# Patient Record
Sex: FEMALE | Race: WHITE | Employment: FULL TIME | URBAN - METROPOLITAN AREA
[De-identification: names, ages, dates, MRNs, and addresses within clinical notes are randomized per-mention and may not be internally consistent; named-entity substitution may affect disease eponyms.]

---

## 2019-05-27 ENCOUNTER — HOSPITAL ENCOUNTER (INPATIENT)
Facility: HOSPITAL | Age: 54
LOS: 2 days | Discharge: HOME | DRG: 390 | End: 2019-05-29
Attending: EMERGENCY MEDICINE | Admitting: HOSPITALIST
Payer: COMMERCIAL

## 2019-05-27 ENCOUNTER — APPOINTMENT (OUTPATIENT)
Dept: GENERAL RADIOLOGY | Facility: HOSPITAL | Age: 54
DRG: 390 | End: 2019-05-27
Attending: EMERGENCY MEDICINE
Payer: COMMERCIAL

## 2019-05-27 ENCOUNTER — APPOINTMENT (OUTPATIENT)
Dept: CT IMAGING | Facility: HOSPITAL | Age: 54
DRG: 390 | End: 2019-05-27
Attending: EMERGENCY MEDICINE
Payer: COMMERCIAL

## 2019-05-27 DIAGNOSIS — K56.609 SMALL BOWEL OBSTRUCTION (HCC): Primary | ICD-10-CM

## 2019-05-27 PROCEDURE — 99222 1ST HOSP IP/OBS MODERATE 55: CPT | Performed by: HOSPITALIST

## 2019-05-27 PROCEDURE — 74177 CT ABD & PELVIS W/CONTRAST: CPT | Performed by: EMERGENCY MEDICINE

## 2019-05-27 PROCEDURE — 71045 X-RAY EXAM CHEST 1 VIEW: CPT | Performed by: EMERGENCY MEDICINE

## 2019-05-27 RX ORDER — ONDANSETRON 2 MG/ML
4 INJECTION INTRAMUSCULAR; INTRAVENOUS ONCE
Status: COMPLETED | OUTPATIENT
Start: 2019-05-27 | End: 2019-05-27

## 2019-05-27 RX ORDER — MORPHINE SULFATE 2 MG/ML
2 INJECTION, SOLUTION INTRAMUSCULAR; INTRAVENOUS EVERY 2 HOUR PRN
Status: DISCONTINUED | OUTPATIENT
Start: 2019-05-27 | End: 2019-05-29

## 2019-05-27 RX ORDER — ONDANSETRON 2 MG/ML
INJECTION INTRAMUSCULAR; INTRAVENOUS
Status: COMPLETED
Start: 2019-05-27 | End: 2019-05-27

## 2019-05-27 RX ORDER — MORPHINE SULFATE 4 MG/ML
4 INJECTION, SOLUTION INTRAMUSCULAR; INTRAVENOUS EVERY 2 HOUR PRN
Status: DISCONTINUED | OUTPATIENT
Start: 2019-05-27 | End: 2019-05-29

## 2019-05-27 RX ORDER — MORPHINE SULFATE 2 MG/ML
1 INJECTION, SOLUTION INTRAMUSCULAR; INTRAVENOUS EVERY 2 HOUR PRN
Status: DISCONTINUED | OUTPATIENT
Start: 2019-05-27 | End: 2019-05-29

## 2019-05-27 RX ORDER — SODIUM CHLORIDE 0.9 % (FLUSH) 0.9 %
3 SYRINGE (ML) INJECTION AS NEEDED
Status: DISCONTINUED | OUTPATIENT
Start: 2019-05-27 | End: 2019-05-29

## 2019-05-27 RX ORDER — SODIUM CHLORIDE, SODIUM LACTATE, POTASSIUM CHLORIDE, CALCIUM CHLORIDE 600; 310; 30; 20 MG/100ML; MG/100ML; MG/100ML; MG/100ML
INJECTION, SOLUTION INTRAVENOUS CONTINUOUS
Status: DISCONTINUED | OUTPATIENT
Start: 2019-05-27 | End: 2019-05-29

## 2019-05-27 RX ORDER — MORPHINE SULFATE 4 MG/ML
4 INJECTION, SOLUTION INTRAMUSCULAR; INTRAVENOUS ONCE
Status: COMPLETED | OUTPATIENT
Start: 2019-05-27 | End: 2019-05-27

## 2019-05-27 RX ORDER — ONDANSETRON 2 MG/ML
4 INJECTION INTRAMUSCULAR; INTRAVENOUS EVERY 6 HOURS PRN
Status: DISCONTINUED | OUTPATIENT
Start: 2019-05-27 | End: 2019-05-29

## 2019-05-27 NOTE — CONSULTS
[facility]    Report of Consultation    Vida Lam Patient Status:  Inpatient    1965 MRN [id number]   Location [facility] 4W/SW/SE Attending Liat De Santiago MD   Hosp Day # 0 [facility] 57 [facility]     Date of Admission: none, frequency none  MUSCULOSKELETAL: no joint complaints upper or lower extremities  NEURO: no sensory or motor complaint  HEMATOLOGY: no bruising or excessive bleeding; anticoagulants: none  ENDOCRINE: denies excessive thirst or urination; denies unexpe technique. 3. Linear atelectasis and/or scar left lung base.     Dictated by (CST): Claudia Arndt MD on 5/27/2019 at 11:10     Approved by (CST): Claudia Arndt MD on 5/27/2019 at 11:12          Ct Abdomen Pelvis Iv Contrast, No Oral (er)    Result Date: 5

## 2019-05-27 NOTE — ED PROVIDER NOTES
Patient Seen in: [facility] AND [facility] Emergency Department    History   Patient presents with:  Abdomen/Flank Pain (GI/)    Stated Complaint:     HPI    67-year-old female presents for complaint of severe epigastric pain for the past 4 hours.   Pain is con and well-nourished. HENT:   Head: Normocephalic and atraumatic. Eyes: Pupils are equal, round, and reactive to light. EOM are normal.   Neck: Normal range of motion. Neck supple.    Cardiovascular: Normal rate, regular rhythm, normal heart sounds and in interpreted by ed physician. MDM    CBC BMP hepatic panel lipase all unremarkable. EKG without acute ischemic changes. An NG was placed. Patient had 400 mL output. CT scan is consistent with small bowel obstruction.   Patient will be ad mass. GALLBLADDER: Normal wall thickness. No pericholecystic fluid. BILIARY: No intra-or extrahepatic biliary ductal dilation. SPLEEN: Unremarkable PANCREAS: The pancreas enhances symmetrically. No ductal dilation.  ADRENALS: Unremarkable KIDNEYS: The kidn Plan     Clinical Impression:  Small bowel obstruction ([facility] [address]  (primary encounter diagnosis)    Disposition:  Admit  5/27/2019 12:58 pm    Follow-up:  No follow-up provider specified.       Medications Prescribed:  There are no discharge medications for this

## 2019-05-27 NOTE — H&P
[facility]    PATIENT'S NAME: Katelynn Bowen   ATTENDING PHYSICIAN: Love Landeros MD   PATIENT ACCOUNT#:   [id number]    LOCATION:  [place] [address]  MEDICAL RECORD #:   [id number]       YOB: 1965  ADMISSION DATE:       05/ Anicteric sclerae. Pupils equal, round, reactive to light and accommodation. Ears, nose normal.  NG tube in place with brownish liquid in container bag. NECK:  Supple. No lymphadenopathy. Trachea midline. Full range of motion.    LUNGS:  Clear to [employer]

## 2019-05-27 NOTE — PLAN OF CARE
Problem: Patient/Family Goals  Goal: Patient/Family Long Term Goal  Description  Patient's Long Term Goal: To have my pain controlled    Interventions:  - To take pain medications as prescribed  - See additional Care Plan goals for specific interventions nausea and vomiting  Description  INTERVENTIONS:  - Maintain adequate hydration with IV or PO as ordered and tolerated  - Nasogastric tube to low intermittent suction as ordered  - Evaluate effectiveness of ordered antiemetic medications  - Provide nonphar of care  - Consider collaborating with pharmacy to review patient's medication profile  - Implement strategies to promote bladder emptying  Outcome: Progressing     Problem: MUSCULOSKELETAL - ADULT  Goal: Return mobility to safest level of function  Anupam

## 2019-05-28 ENCOUNTER — APPOINTMENT (OUTPATIENT)
Dept: GENERAL RADIOLOGY | Facility: HOSPITAL | Age: 54
DRG: 390 | End: 2019-05-28
Attending: SURGERY
Payer: COMMERCIAL

## 2019-05-28 PROCEDURE — 74019 RADEX ABDOMEN 2 VIEWS: CPT | Performed by: SURGERY

## 2019-05-28 PROCEDURE — 99233 SBSQ HOSP IP/OBS HIGH 50: CPT | Performed by: HOSPITALIST

## 2019-05-28 RX ORDER — HEPARIN SODIUM 5000 [USP'U]/ML
5000 INJECTION, SOLUTION INTRAVENOUS; SUBCUTANEOUS EVERY 8 HOURS SCHEDULED
Status: DISCONTINUED | OUTPATIENT
Start: 2019-05-28 | End: 2019-05-29

## 2019-05-28 RX ORDER — PANTOPRAZOLE SODIUM 40 MG/1
40 TABLET, DELAYED RELEASE ORAL
Status: DISCONTINUED | OUTPATIENT
Start: 2019-05-28 | End: 2019-05-29

## 2019-05-28 NOTE — PLAN OF CARE
Pt's vital signs stable. PRN morphine provided for pain. Alert and oriented x4. On room air. NPO. NG to LIS. Voids freely. Bedrest with bathroom privileges. SCDs for DVT prophylaxis. LR infusing at 125cc/hr. Appropriate safety precautions maintained.  Bed l and perform as needed  - Assess and instruct to report SOB or any respiratory difficulty  - Respiratory Therapy support as indicated  - Manage/alleviate anxiety  - Monitor for signs/symptoms of CO2 retention  Outcome: Progressing     Problem: Ruthie Sandifer over-consumption  Outcome: Progressing     Problem: GENITOURINARY - ADULT  Goal: Absence of urinary retention  Description  INTERVENTIONS:  - Assess patient?s ability to void and empty bladder  - Monitor intake/output and perform bladder scan as needed  -

## 2019-05-28 NOTE — PROGRESS NOTES
[place] [facility] - [facility]    Progress Note    Vallarie Los Patient Status:  Inpatient    1965 MRN [id number]   Location [facility] 4W/SW/SE Attending Andrés Urbina MD   Hosp Day # 1 PCP [facility]            Subjective:     Pt feels no p nasogastric tube is in the distal esophagus approximately 4 cm above the diaphragm. 2. Mild enlargement of cardiac silhouette which may be exaggerated by technique. 3. Linear atelectasis and/or scar left lung base.     Dictated by (CST): Andres Boxer, MD o Group  5/28/2019  11:35 AM

## 2019-05-28 NOTE — PROGRESS NOTES
[facility]  Progress Note     Magali Jara  : 1965    Status: Inpatient  Day #: 1    Attending: Laury Wood MD  PCP: Piotr Mott      Assessment and Plan     Small Bowel Obstruction 2/2 adhesions   -General surgery on consult  -C Tip of nasogastric tube is in the distal esophagus approximately 4 cm above the diaphragm. 2. Mild enlargement of cardiac silhouette which may be exaggerated by technique. 3. Linear atelectasis and/or scar left lung base.     Dictated by (CST): Marilou Vera

## 2019-05-28 NOTE — PLAN OF CARE
Problem: RESPIRATORY - ADULT  Goal: Achieves optimal ventilation and oxygenation  Description  INTERVENTIONS:  - Assess for changes in respiratory status  - Assess for changes in mentation and behavior  - Position to facilitate oxygenation and minimize r values  - Obtain nutritional consult as needed  - Optimize oral hygiene and moisture  - Encourage food from home; allow for food preferences  - Enhance eating environment  Outcome: Progressing  Goal: Achieves appropriate nutritional intake (bariatric)  Robert full liquid and she Tolarated both time, no nausea or abdominal pain at this time. Ngt d/c'ed as ordered. Pt refused her heparin shot and reported that to Dr Davon Bermudez.  Ivf continuing at 125 ml/hr

## 2019-05-29 VITALS
WEIGHT: 160 LBS | RESPIRATION RATE: 16 BRPM | BODY MASS INDEX: 23.7 KG/M2 | HEIGHT: 69 IN | OXYGEN SATURATION: 100 % | TEMPERATURE: 98 F | SYSTOLIC BLOOD PRESSURE: 161 MMHG | HEART RATE: 69 BPM | DIASTOLIC BLOOD PRESSURE: 87 MMHG

## 2019-05-29 PROCEDURE — 99239 HOSP IP/OBS DSCHRG MGMT >30: CPT | Performed by: HOSPITALIST

## 2019-05-29 RX ORDER — POLYETHYLENE GLYCOL 3350 17 G/17G
17 POWDER, FOR SOLUTION ORAL 2 TIMES DAILY PRN
Qty: 30 EACH | Refills: 0 | Status: SHIPPED | OUTPATIENT
Start: 2019-05-29 | End: 2019-11-04

## 2019-05-29 RX ORDER — DOCUSATE SODIUM 100 MG/1
100 CAPSULE, LIQUID FILLED ORAL 2 TIMES DAILY
Qty: 60 CAPSULE | Refills: 0 | Status: SHIPPED | OUTPATIENT
Start: 2019-05-29 | End: 2019-11-04

## 2019-05-29 NOTE — DISCHARGE SUMMARY
[facility]    Discharge Summary    Ping Jain Patient Status:  Inpatient    1965 MRN [id number]   Location [facility] 4W/SW/SE Attending Claudia Garcia MD   Hosp Day # 2 PCP 57 [facility]     Date of Admission: / General surgery    Discharge Condition:  Good    Discharge Medications:      Discharge Medications      START taking these medications      Instructions Prescription details   docusate sodium 100 MG Caps  Commonly known as:  COLACE      Take 1 capsule (100

## 2019-05-29 NOTE — PLAN OF CARE
Problem: Patient/Family Goals  Goal: Patient/Family Long Term Goal  Description  Patient's Long Term Goal: Go home    Interventions:  - advance diet as tolerated   - See additional Care Plan goals for specific interventions  Outcome: Adequate for Dischar vomiting  Description  INTERVENTIONS:  - Maintain adequate hydration with IV or PO as ordered and tolerated  - Nasogastric tube to low intermittent suction as ordered  - Evaluate effectiveness of ordered antiemetic medications  - Provide nonpharmacologic c urinary retention protocol/standard of care  - Consider collaborating with pharmacy to review patient's medication profile  - Implement strategies to promote bladder emptying  Outcome: Adequate for Discharge     Problem: MUSCULOSKELETAL - ADULT  Goal: Retu

## 2019-05-29 NOTE — PLAN OF CARE
Problem: Patient/Family Goals  Goal: Patient/Family Long Term Goal  Description  Patient's Long Term Goal:    Interventions:  -  - See additional Care Plan goals for specific interventions  Outcome: Progressing  Goal: Patient/Family Short Term Goal  Desc ordered  - Evaluate effectiveness of ordered antiemetic medications  - Provide nonpharmacologic comfort measures as appropriate  - Advance diet as tolerated, if ordered  - Obtain nutritional consult as needed  - Evaluate fluid balance  Outcome: Progressing Problem: MUSCULOSKELETAL - ADULT  Goal: Return mobility to safest level of function  Description  INTERVENTIONS:  - Assess patient stability and activity tolerance for standing, transferring and ambulating w/ or w/o assistive devices  - Assist with trans

## 2019-05-29 NOTE — DIETARY NOTE
Educated pt on low fiber diet for 2 weeks with progression to higher feber diet as tolerated per MD order. Handout and contact information provided. Urged chewing foods well and drinking adequate fluids.

## 2019-06-14 ENCOUNTER — OFFICE VISIT (OUTPATIENT)
Dept: OTHER | Facility: HOSPITAL | Age: 54
End: 2019-06-14
Attending: EMERGENCY MEDICINE

## 2019-06-14 DIAGNOSIS — Z00.00 ROUTINE GENERAL MEDICAL EXAMINATION AT A HEALTH CARE FACILITY: Primary | ICD-10-CM

## 2019-06-14 PROCEDURE — 86480 TB TEST CELL IMMUN MEASURE: CPT

## 2019-10-04 NOTE — LETTER
November 11, 2019         ALEX Elder, FNP-C  [address]  [address] [place] [address]  [facility]      Patient: Sravani Newby   YOB: 1965   Date of Visit: 11/11/2019       Dear ALEX Richardson, FNP-C,    I saw your patient, D [Negative] : Genitourinary [Depression] : no depression [Dyspareunia] : no dyspareunia [FreeTextEntry3] : Pt is feeling good, denies depression [FreeTextEntry4] : No urinary complaints [de-identified] : Pt denies any SOB  [de-identified] : Pt denies any calf pain

## 2019-11-01 ENCOUNTER — TELEPHONE (OUTPATIENT)
Dept: FAMILY MEDICINE CLINIC | Facility: CLINIC | Age: 54
End: 2019-11-01

## 2019-11-01 NOTE — TELEPHONE ENCOUNTER
Reason for Call/Symptoms: right foot pain. 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 Per patient, after work, her right foot in the arch of the foot is very painful 9/10.  Patient also co

## 2019-11-01 NOTE — TELEPHONE ENCOUNTER
Patient states has been having pain inside right foot every day and have some pain now, she has an appoijntment scheduled on 11/4 with ALEX Lyon    Transferred to triage

## 2019-11-04 ENCOUNTER — HOSPITAL ENCOUNTER (OUTPATIENT)
Dept: GENERAL RADIOLOGY | Age: 54
Discharge: HOME | End: 2019-11-04
Attending: NURSE PRACTITIONER
Payer: COMMERCIAL

## 2019-11-04 ENCOUNTER — OFFICE VISIT (OUTPATIENT)
Dept: FAMILY MEDICINE CLINIC | Facility: CLINIC | Age: 54
End: 2019-11-04

## 2019-11-04 VITALS
DIASTOLIC BLOOD PRESSURE: 64 MMHG | WEIGHT: 168 LBS | BODY MASS INDEX: 25.46 KG/M2 | HEIGHT: 68 IN | SYSTOLIC BLOOD PRESSURE: 102 MMHG | HEART RATE: 72 BPM

## 2019-11-04 DIAGNOSIS — M25.562 ACUTE PAIN OF LEFT KNEE: ICD-10-CM

## 2019-11-04 DIAGNOSIS — M79.671 RIGHT FOOT PAIN: ICD-10-CM

## 2019-11-04 DIAGNOSIS — M25.562 ACUTE PAIN OF LEFT KNEE: Primary | ICD-10-CM

## 2019-11-04 PROBLEM — K56.609 SMALL BOWEL OBSTRUCTION (HCC): Status: RESOLVED | Noted: 2019-05-27 | Resolved: 2019-11-04

## 2019-11-04 PROCEDURE — 73630 X-RAY EXAM OF FOOT: CPT | Performed by: NURSE PRACTITIONER

## 2019-11-04 PROCEDURE — 73562 X-RAY EXAM OF KNEE 3: CPT | Performed by: NURSE PRACTITIONER

## 2019-11-04 PROCEDURE — 99204 OFFICE O/P NEW MOD 45 MIN: CPT | Performed by: NURSE PRACTITIONER

## 2019-11-04 RX ORDER — MELOXICAM 15 MG/1
15 TABLET ORAL DAILY
Qty: 30 TABLET | Refills: 1 | Status: SHIPPED | OUTPATIENT
Start: 2019-11-04

## 2019-11-04 NOTE — ASSESSMENT & PLAN NOTE
meloxicam 15 mg I po q day  Ice 20 min 4-6 times per day  Right foot xray  Refer Dr Zachariah Felton

## 2019-11-04 NOTE — ASSESSMENT & PLAN NOTE
meloxicam 15 mg I po q day with food  Ice 20 min 4-6 times per day  Refer Dr Katherine Schultz  Left knee xray

## 2019-11-04 NOTE — PROGRESS NOTES
HPI  Pt here for bilat R>L foot pain. Is on her feet a lot; does 5k run/walks. Is having left knee pain. Is icing knee and pain is improving. Recently started new job in [employer] on feet constantly. Worked at a [employer] job for 20 years.  Pain in feet start Smoking status: Light Tobacco Smoker      Smokeless tobacco: Never Used    Substance and Sexual Activity      Alcohol use: Not on file      Drug use: Not on file      Sexual activity: Not on file    Lifestyle      Physical activity:        Days per wee Ice 20 min 4-6 times per day  Right foot xray  Refer Dr Vicente Preston         Relevant Medications    Meloxicam 15 MG Oral Tab    Other Relevant Orders    XR FOOT, COMPLETE (MIN 3 VIEWS), RIGHT (CPT=73630)    XR KNEE ROUTINE (3 VIEWS), LEFT (CPT=73562)    PODIATR

## 2019-11-04 NOTE — PATIENT INSTRUCTIONS
Knee Pain  Knee pain is very common. It’s especially common in active people who put a lot of pressure on their knees, like runners. It affects women more often than men. Your kneecap (patella) is a thick, round bone.  It covers and protects the front po This type of knee pain is a dull, aching pain in the front of the knee in the area under and around the kneecap. This pain may start quickly or slowly. Your pain might be worse when you squat, run, or sit for a long time.  Stairclimbing may be painful or di · Regularly do all the exercises your doctor or physical therapist advises  · Support your knee as advised by your doctor or physical therapist  · Increase training gradually, and ease up on training when needed  · Have an expert check your gait for runnin · Compression. Putting pressure on an injury helps reduce swelling and provides support. Wrap the injured area firmly with an elastic bandage or wrap. Make sure not to wrap the bandage too tightly or you will cut off blood flow to the injured area.  If your To get a good fit, you need to know the shape of your foot. Do this simple test: While standing, place your foot on a piece of paper and trace around it. Is your foot straight or curved?  Do you have a foot problem, such as a bunion, that causes your foot o · Each time you buy shoes, have both your feet measured while you are standing. Foot size changes with time. · Pick shoes to suit their purpose. High heels are OK for an occasional night on the town. But for everyday wear, choose a more sensible shoe.   ·

## 2019-11-05 ENCOUNTER — TELEPHONE (OUTPATIENT)
Dept: NEUROLOGY | Facility: CLINIC | Age: 54
End: 2019-11-05

## 2019-11-05 ENCOUNTER — OFFICE VISIT (OUTPATIENT)
Dept: NEUROLOGY | Facility: CLINIC | Age: 54
End: 2019-11-05

## 2019-11-05 VITALS
DIASTOLIC BLOOD PRESSURE: 82 MMHG | HEIGHT: 68 IN | BODY MASS INDEX: 26 KG/M2 | RESPIRATION RATE: 17 BRPM | HEART RATE: 66 BPM | SYSTOLIC BLOOD PRESSURE: 122 MMHG

## 2019-11-05 DIAGNOSIS — M17.12 PRIMARY OSTEOARTHRITIS OF LEFT KNEE: ICD-10-CM

## 2019-11-05 DIAGNOSIS — M25.462 EFFUSION OF LEFT KNEE: ICD-10-CM

## 2019-11-05 DIAGNOSIS — M22.2X9 PATELLOFEMORAL PAIN SYNDROME, UNSPECIFIED LATERALITY: Primary | ICD-10-CM

## 2019-11-05 DIAGNOSIS — M25.562 ACUTE PAIN OF LEFT KNEE: ICD-10-CM

## 2019-11-05 PROCEDURE — 99244 OFF/OP CNSLTJ NEW/EST MOD 40: CPT | Performed by: PHYSICAL MEDICINE & REHABILITATION

## 2019-11-05 NOTE — PROGRESS NOTES
Location of pain: left knee   Rate your pain: 4   Timeline of pain: 4 weeks  Characterize the pain: aching,   Worse with:standing on it for long periods  Better with: meds is helping  Medications used: meloxicam  Previous Injections: none  Previous Imaging

## 2019-11-05 NOTE — H&P
[address] H&P    Requesting Physician: Geni Hutchinson MD    Chief Complaint (Reason for Visit):  Patient presents with:  Knee Pain: Pt states that shes here for left knee pain       History of Pre HENT: Negative. Eyes: Negative. Respiratory: Negative. Cardiovascular: Negative. Gastrointestinal: Negative. Genitourinary: Negative. Musculoskeletal: As per HPI   Skin: Negative.     Neurological: As per HPI  Endo/Heme/Allergies: Tor Stamp IU/mL Final   • Quantiferon TB1 minus NIL 06/14/2019 0.00  IU/mL Final   • Quantiferon TB2 minus NIL 06/14/2019 0.00  IU/mL Final   • Quantiferon TB Mitogen minus NIL 06/14/2019 >10.00  IU/mL Final   • Quantiferon TB Result 06/14/2019 Negative  Negative Fi 13.7  11.0 - 15.0 % Final   • PLT 05/27/2019 297.0  150.0 - 450.0 10(3)uL Final   • Neutrophil Absolute Prelim 05/27/2019 8.94* 1.50 - 7.70 x10 (3) uL Final   • Neutrophil Absolute 05/27/2019 8.94* 1.50 - 7.70 x10(3) uL Final   • Lymphocyte Absolute 05/27/ • PLT 05/28/2019 273.0  150.0 - 450.0 10(3)uL Final   • Neutrophil Absolute Prelim 05/28/2019 9.90* 1.50 - 7.70 x10 (3) uL Final   • Neutrophil Absolute 05/28/2019 9.90* 1.50 - 7.70 x10(3) uL Final   • Lymphocyte Absolute 05/28/2019 1.89  1.00 - 4.00 x10 05/29/2019 109  >=60 Final   • Magnesium 05/29/2019 2.1  1.6 - 2.6 mg/dL Final   • Phosphorus 05/29/2019 3.4  2.5 - 4.9 mg/dL Final   ]      Radiology Imaging:  I reviewed with the patient her X-ray of the knees left from 11/4/2019  XR KNEE ROUTINE (3 VIEW degenerative meniscus. At this time I am recommending an aspiration and injection of the left knee.   I have placed an order for this and if the patient feels better after 1 week of being on meloxicam and physical therapy, then she can cancel the procedure

## 2019-11-05 NOTE — TELEPHONE ENCOUNTER
LEFT knee aspiration and CSI under ultrasound guidance CPT Code: 21450, 26256,   [facility], in office injections don't require authorization.  Will inform nursing

## 2019-11-05 NOTE — PATIENT INSTRUCTIONS
1) Make an appointment to see me next Tuesday for the aspiration and injection of the LEFT knee.  If you feel better and do not want the injection, then call and cancel  2) Begin formal physical therapy at [facility] in [facility]  3) Contin

## 2019-11-11 ENCOUNTER — OFFICE VISIT (OUTPATIENT)
Dept: PODIATRY CLINIC | Facility: CLINIC | Age: 54
End: 2019-11-11

## 2019-11-11 DIAGNOSIS — M77.9 TENDONITIS: Primary | ICD-10-CM

## 2019-11-11 PROCEDURE — 99243 OFF/OP CNSLTJ NEW/EST LOW 30: CPT | Performed by: PODIATRIST

## 2019-11-11 PROCEDURE — L3060 FOOT ARCH SUPP LONGITUD/META: HCPCS | Performed by: PODIATRIST

## 2019-11-11 NOTE — PROGRESS NOTES
HPI:    Patient ID: Donna Rivera is a 53year old female. This pleasant 27-year-old female presents as a new patient to me on consult from [facility]. Patient's chief concern is pain and swelling of the right foot.   She states is been present fo evening for 15 to 20 minutes. I also discussed the use of meloxicam.  Patient is well-informed and indicates an understanding. I plan no long-term care unless progressive increasing and recurrent.   She is well-informed and indicates an understanding foll

## 2019-11-12 ENCOUNTER — OFFICE VISIT (OUTPATIENT)
Dept: NEUROLOGY | Facility: CLINIC | Age: 54
End: 2019-11-12

## 2019-11-12 ENCOUNTER — TELEPHONE (OUTPATIENT)
Dept: FAMILY MEDICINE CLINIC | Facility: CLINIC | Age: 54
End: 2019-11-12

## 2019-11-12 DIAGNOSIS — M22.2X9 PATELLOFEMORAL PAIN SYNDROME, UNSPECIFIED LATERALITY: Primary | ICD-10-CM

## 2019-11-12 DIAGNOSIS — M17.12 PRIMARY OSTEOARTHRITIS OF LEFT KNEE: ICD-10-CM

## 2019-11-12 DIAGNOSIS — M25.562 ACUTE PAIN OF LEFT KNEE: ICD-10-CM

## 2019-11-12 DIAGNOSIS — M25.462 EFFUSION OF LEFT KNEE: ICD-10-CM

## 2019-11-12 PROCEDURE — 20611 DRAIN/INJ JOINT/BURSA W/US: CPT | Performed by: PHYSICAL MEDICINE & REHABILITATION

## 2019-11-12 RX ORDER — TRIAMCINOLONE ACETONIDE 40 MG/ML
40 INJECTION, SUSPENSION INTRA-ARTICULAR; INTRAMUSCULAR ONCE
Status: COMPLETED | OUTPATIENT
Start: 2019-11-12 | End: 2019-11-12

## 2019-11-12 RX ORDER — LIDOCAINE HYDROCHLORIDE 10 MG/ML
5 INJECTION, SOLUTION INFILTRATION; PERINEURAL ONCE
Status: COMPLETED | OUTPATIENT
Start: 2019-11-12 | End: 2019-11-12

## 2019-11-12 RX ORDER — LIDOCAINE HYDROCHLORIDE 10 MG/ML
1 INJECTION, SOLUTION INFILTRATION; PERINEURAL ONCE
Status: COMPLETED | OUTPATIENT
Start: 2019-11-12 | End: 2019-11-12

## 2019-11-12 RX ADMIN — LIDOCAINE HYDROCHLORIDE 5 ML: 10 INJECTION, SOLUTION INFILTRATION; PERINEURAL at 15:10:00

## 2019-11-12 RX ADMIN — LIDOCAINE HYDROCHLORIDE 1 ML: 10 INJECTION, SOLUTION INFILTRATION; PERINEURAL at 15:10:00

## 2019-11-12 RX ADMIN — TRIAMCINOLONE ACETONIDE 40 MG: 40 INJECTION, SUSPENSION INTRA-ARTICULAR; INTRAMUSCULAR at 15:11:00

## 2019-11-12 NOTE — TELEPHONE ENCOUNTER
Pt requesting a referral to see Dr. Jennifer Medina. Pt has an appt on hold for 1/17/20. States she needs the referral to schedule the appt. Please contact pt upon completed.

## 2019-11-12 NOTE — PROCEDURES
[address]   Knee Joint Injection Procedure Note    CHIEF COMPLAINT:  Patient presents with:   Injection: left knee      PROCEDURE PERFORMED:  LEF knee intra-articular corticosteroid injection under ultrasoun Total 05/27/2019 0.4  0.1 - 2.0 mg/dL Final   • Bilirubin, Direct 05/27/2019 0.1  0.0 - 0.2 mg/dL Final   • Total Protein 05/27/2019 8.5* 6.4 - 8.2 g/dL Final   • Albumin 05/27/2019 4.6  3.4 - 5.0 g/dL Final   • Lipase 05/27/2019 229  73 - 393 U/L Final Calcium, Total 05/28/2019 9.1  8.5 - 10.1 mg/dL Final   • Calculated Osmolality 05/28/2019 297* 275 - 295 mOsm/kg Final   • GFR, Non- 05/28/2019 72  >=60 Final   • GFR, -American 05/28/2019 83  >=60 Final   • WBC 05/28/2019 12.6* 4.0 Final   • Glucose 05/29/2019 104* 70 - 99 mg/dL Final   • Sodium 05/29/2019 141  136 - 145 mmol/L Final   • Potassium 05/29/2019 4.0  3.5 - 5.1 mmol/L Final   • Chloride 05/29/2019 105  98 - 112 mmol/L Final   • CO2 05/29/2019 30.0  21.0 - 32.0 mmol/L Anay assessment and plan. Patient is in agreement with the plan. All questions were answered. No barriers to learning identified. Permanent pictures were saved. INSTRUCTIONS GIVEN TO PATIENT:    \"You will see an effect in the next 2-3 days.   Please co

## 2019-11-14 ENCOUNTER — TELEPHONE (OUTPATIENT)
Dept: NEUROLOGY | Facility: CLINIC | Age: 54
End: 2019-11-14

## 2019-11-14 NOTE — TELEPHONE ENCOUNTER
Pt states that her knee is bothering here since the injection. She states that she has been feeling very shaky. She states that the more she's on her feet she's in a lot of pain.

## 2019-11-14 NOTE — TELEPHONE ENCOUNTER
Dr. Santiago Solano, patient is requesting a referral for a follow up visit with Dr. Ael Perdomo. Patient is scheduled for 01/17/20. Referral has been pended, please advise.    Please reply to pool: EM TRIAGE SUPPORT

## 2019-11-18 NOTE — TELEPHONE ENCOUNTER
Patient back and discussed her knee pain. She states she is feeling much better and feels that it was a 1 day where she had headaches, flushing, and tachycardia but this resolved. She will follow-up with me as scheduled.     The patient was in agreement w

## 2019-11-19 ENCOUNTER — TELEPHONE (OUTPATIENT)
Dept: NEUROLOGY | Facility: CLINIC | Age: 54
End: 2019-11-19

## 2019-11-19 ENCOUNTER — MED REC SCAN ONLY (OUTPATIENT)
Dept: NEUROLOGY | Facility: CLINIC | Age: 54
End: 2019-11-19

## 2019-12-12 ENCOUNTER — MED REC SCAN ONLY (OUTPATIENT)
Dept: NEUROLOGY | Facility: CLINIC | Age: 54
End: 2019-12-12

## 2020-01-07 ENCOUNTER — MED REC SCAN ONLY (OUTPATIENT)
Dept: NEUROLOGY | Facility: CLINIC | Age: 55
End: 2020-01-07

## 2020-02-03 ENCOUNTER — OFFICE VISIT (OUTPATIENT)
Dept: FAMILY MEDICINE CLINIC | Facility: CLINIC | Age: 55
End: 2020-02-03

## 2020-02-03 ENCOUNTER — NURSE TRIAGE (OUTPATIENT)
Dept: OTHER | Age: 55
End: 2020-02-03

## 2020-02-03 VITALS
HEART RATE: 75 BPM | TEMPERATURE: 98 F | WEIGHT: 171 LBS | DIASTOLIC BLOOD PRESSURE: 83 MMHG | HEIGHT: 68 IN | BODY MASS INDEX: 25.91 KG/M2 | SYSTOLIC BLOOD PRESSURE: 119 MMHG

## 2020-02-03 DIAGNOSIS — J40 BRONCHITIS: Primary | ICD-10-CM

## 2020-02-03 PROCEDURE — 99213 OFFICE O/P EST LOW 20 MIN: CPT | Performed by: NURSE PRACTITIONER

## 2020-02-03 RX ORDER — BUDESONIDE AND FORMOTEROL FUMARATE DIHYDRATE 160; 4.5 UG/1; UG/1
2 AEROSOL RESPIRATORY (INHALATION) 2 TIMES DAILY
Qty: 1 INHALER | Refills: 0 | Status: SHIPPED | OUTPATIENT
Start: 2020-02-03 | End: 2020-07-27 | Stop reason: ALTCHOICE

## 2020-02-03 RX ORDER — ALBUTEROL SULFATE 90 UG/1
2 AEROSOL, METERED RESPIRATORY (INHALATION) EVERY 4 HOURS PRN
Qty: 18 G | Refills: 5 | Status: SHIPPED | OUTPATIENT
Start: 2020-02-03

## 2020-02-03 NOTE — TELEPHONE ENCOUNTER
Action Requested: Summary for Provider     []  Critical Lab, Recommendations Needed  [] Need Additional Advice  []   FYI    []   Need Orders  [] Need Medications Sent to Pharmacy  []  Other     SUMMARY:appt made, cough, cold s/s, using symbicort/zycam x 3

## 2020-02-04 NOTE — ASSESSMENT & PLAN NOTE
Supportive care discussed to help alleviate symptoms  Albuterol hfa 2 puffs qid prn   symbicort 160/4.5 mcg 2 puffs bid   Please call if symptoms worsen or are not resolving.

## 2020-02-05 ENCOUNTER — TELEPHONE (OUTPATIENT)
Dept: FAMILY MEDICINE CLINIC | Facility: CLINIC | Age: 55
End: 2020-02-05

## 2020-02-05 NOTE — TELEPHONE ENCOUNTER
Pt informed of Dr Vasquez JOHNSON message below. Pt voiced understanding and agrees. She will stop by tomorrow to  the RTW note.

## 2020-02-05 NOTE — TELEPHONE ENCOUNTER
S/s are more in line with pt having the flu.  zpack will not do anything to help s/s. I will place note to return to work on Monday.

## 2020-02-05 NOTE — TELEPHONE ENCOUNTER
Please see note below. Pt would like to return to work on Monday. Pt also requesting zpak. Pt states she no changes in symptoms. Continue to dry cough, ears clogged, chest pain. Inhaler is helping.  Please advise

## 2020-02-05 NOTE — TELEPHONE ENCOUNTER
Patient requesting note for work. States she has been off for 3 days now. Was seen 02/3.  States she might go back to work on Monday

## 2020-02-08 ENCOUNTER — TELEPHONE (OUTPATIENT)
Dept: FAMILY MEDICINE CLINIC | Facility: CLINIC | Age: 55
End: 2020-02-08

## 2020-02-08 RX ORDER — AZITHROMYCIN 250 MG/1
TABLET, FILM COATED ORAL
Qty: 6 TABLET | Refills: 0 | Status: SHIPPED | OUTPATIENT
Start: 2020-02-08

## 2020-02-08 NOTE — TELEPHONE ENCOUNTER
Patient notified of provider's recommendation. Patient verbalized understanding. Went over home supportive care. Call office if worse or ER if significant worsening symptoms. See symptoms documented on 2/5/20 encounter related to this message.

## 2020-02-08 NOTE — TELEPHONE ENCOUNTER
Patient indicated that saw Saji Briscoe on 2/3/2020. Patient indicated that symptoms are worse.  Patient does not think it is viral thinks it might be bacterial.  Has a cough with green/brown phlegm, sore throat, painful to swallow, sinus pressure, heada

## 2020-02-08 NOTE — TELEPHONE ENCOUNTER
Pt phone call msg rec'antionette. Zpak prescribed to pt. Please advise humidifier use, steam from the shower, teas and broths to help thin out mucous. Salt water gargles. NSAIDS PRN for pain and fever.

## 2020-02-13 ENCOUNTER — TELEPHONE (OUTPATIENT)
Dept: FAMILY MEDICINE CLINIC | Facility: CLINIC | Age: 55
End: 2020-02-13

## 2020-02-13 NOTE — TELEPHONE ENCOUNTER
Pt should be using symbicort bid and using albuterol 2 puffs qid prn.  Both inhaler's needed to help decrease inflammation of lungs

## 2020-02-13 NOTE — TELEPHONE ENCOUNTER
Patient seen in [address] [place] [address] Rd 2/3/20 for bronchitis, had followed up with our office 2/5 because her symptoms were worse, she was given a z-markus.  Finished antibiotic yesterday but reports symptoms returned of coughing with phlegm, congestion, fatigue and chest heavine

## 2020-04-07 ENCOUNTER — TELEPHONE (OUTPATIENT)
Dept: FAMILY MEDICINE CLINIC | Facility: CLINIC | Age: 55
End: 2020-04-07

## 2020-04-07 NOTE — TELEPHONE ENCOUNTER
I have actually never seen this pt. Saw Jg Beaver. Will send to her to review and maybe due telephone encounter.

## 2020-04-07 NOTE — TELEPHONE ENCOUNTER
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 Has h/o chronic bronchitis and would like to take the next 2 weeks off of work.   Note placed to return on 4/19/20 at pts request

## 2020-04-07 NOTE — TELEPHONE ENCOUNTER
Dr Santosh Serra, please advise. 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 She's been off since last Thursday and is due to return to work this Thursday and is very anxious, worried due to covid19.  She had bronchitis in February, and f

## 2020-05-14 ENCOUNTER — TELEPHONE (OUTPATIENT)
Dept: FAMILY MEDICINE CLINIC | Facility: CLINIC | Age: 55
End: 2020-05-14

## 2020-05-14 NOTE — TELEPHONE ENCOUNTER
Patient requesting a call. States she has a few questions that she will like to go over regarding a positive covid coworker. States she feels great and has no symptoms. Would just like to speak to  to get some peace of mind.  Css offered corona hotline bu

## 2020-05-14 NOTE — TELEPHONE ENCOUNTER
Works at [employer]. Had employee that tested positive-no close contact w worker 2 days ago. No residents have covid  Pt wears mask and gloves and does social distancing. Will be going to her summer home in [place].      Advised to mon

## 2020-05-22 ENCOUNTER — TELEPHONE (OUTPATIENT)
Dept: FAMILY MEDICINE CLINIC | Facility: CLINIC | Age: 55
End: 2020-05-22

## 2020-05-22 NOTE — TELEPHONE ENCOUNTER
Patient completed 2 weeks of quarantine in April after co-worker at working site, [employer], tested positive for [address].      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

## 2020-05-22 NOTE — TELEPHONE ENCOUNTER
Called and discussed with patient regarding her concerns. Advise patient to practice social distancing, wear mask, glove and wash hands. Patient verbalized understanding.

## 2020-05-29 NOTE — TELEPHONE ENCOUNTER
Patient calling again for reassurance. Advised to research the [facility] RACHAEL website for updates but not to watch tv all day. Per patient she is symptom free in another state and feels good. Advised to call if she develops symptoms. She agreed to do so.

## 2020-07-13 ENCOUNTER — TELEPHONE (OUTPATIENT)
Dept: FAMILY MEDICINE CLINIC | Facility: CLINIC | Age: 55
End: 2020-07-13

## 2020-07-27 ENCOUNTER — TELEPHONE (OUTPATIENT)
Dept: CASE MANAGEMENT | Age: 55
End: 2020-07-27

## 2020-07-27 NOTE — TELEPHONE ENCOUNTER
Insurance prefers Advair Diskus inhaler over Symbicort. Patient reports she was prescribed the Symbicort for bronchitis back in February; she is NOT currently using the inhaler at this time.  Removed from medication list.

## 2024-09-06 NOTE — PATIENT INSTRUCTIONS
UPPER RESPIRATORY INFECTION-VIRAL  (COLD)    Upper respiratory infections are due to viruses and are very common. Sore throat is a prominent, and often the first, symptom.  The cough that accompanies most colds is annoying but helps physiologically to prote [Good] : ~his/her~  mood as  good [No] : In the past 12 months have you used drugs other than those required for medical reasons? No [No falls in past year] : Patient reported no falls in the past year [0] : 2) Feeling down, depressed, or hopeless: Not at all (0) [PHQ-2 Negative - No further assessment needed] : PHQ-2 Negative - No further assessment needed [Patient declined colonoscopy] : Patient declined colonoscopy [With Family] : lives with family [Employed] : employed [] :  [Fully functional (bathing, dressing, toileting, transferring, walking, feeding)] : Fully functional (bathing, dressing, toileting, transferring, walking, feeding) [Fully functional (using the telephone, shopping, preparing meals, housekeeping, doing laundry, using] : Fully functional and needs no help or supervision to perform IADLs (using the telephone, shopping, preparing meals, housekeeping, doing laundry, using transportation, managing medications and managing finances) [Designated Healthcare Proxy] : Designated healthcare proxy [Name: ___] : Health Care Proxy's Name: [unfilled]  [Relationship: ___] : Relationship: [unfilled] [Current] : Current [[id number]] : 0 [AdvancecareDate] : 9/6/24 [de-identified] : socially only in Summer

## (undated) NOTE — LETTER
AUTHORIZATION FOR SURGICAL OPERATION OR OTHER PROCEDURE    1.  I hereby authorize Dr. Arcenio Og  and the [facility] staff assigned to my case to perform the following operation and/or procedure at the [facility]:    LEFT knee aspiration and CSI under ultra Patient Name: Boston Maddox [id number] 1/1/1965      Patient signature:  ___________________________________________________             Relationship to Patient:           []  Parent    Responsible person                          []  Spouse  In case

## (undated) NOTE — LETTER
4/7/2020          To Whom It May Concern:    Mercedez Wright is currently under my medical care and may not return to work at this time. Please excuse Khris Huynh for 2 weeks. She may return to work on 4/19/2020.   Activity is restricted as follows: non

## (undated) NOTE — LETTER
2/5/2020          To Whom It May Concern:    Dante Solorio is currently under my medical care and may not return to work at this time. Please excuse Saskia Dupont for 5 days. She may return to work on 2/10/2020.   Activity is restricted as follows: none